# Patient Record
Sex: FEMALE | Race: WHITE | NOT HISPANIC OR LATINO | Employment: OTHER | URBAN - METROPOLITAN AREA
[De-identification: names, ages, dates, MRNs, and addresses within clinical notes are randomized per-mention and may not be internally consistent; named-entity substitution may affect disease eponyms.]

---

## 2022-09-09 ENCOUNTER — HOSPITAL ENCOUNTER (EMERGENCY)
Facility: CLINIC | Age: 72
Discharge: HOME | End: 2022-09-09
Attending: PHYSICIAN ASSISTANT | Admitting: PHYSICIAN ASSISTANT
Payer: MEDICARE

## 2022-09-09 VITALS
TEMPERATURE: 98 F | OXYGEN SATURATION: 96 % | DIASTOLIC BLOOD PRESSURE: 53 MMHG | WEIGHT: 154 LBS | SYSTOLIC BLOOD PRESSURE: 135 MMHG | HEART RATE: 70 BPM

## 2022-09-09 DIAGNOSIS — T63.481A ALLERGIC REACTION TO INSECT STING: ICD-10-CM

## 2022-09-09 PROCEDURE — G0463 HOSPITAL OUTPT CLINIC VISIT: HCPCS | Performed by: PHYSICIAN ASSISTANT

## 2022-09-09 PROCEDURE — 99203 OFFICE O/P NEW LOW 30 MIN: CPT | Performed by: PHYSICIAN ASSISTANT

## 2022-09-09 RX ORDER — LOSARTAN POTASSIUM AND HYDROCHLOROTHIAZIDE 12.5; 1 MG/1; MG/1
TABLET ORAL
COMMUNITY

## 2022-09-09 RX ORDER — PREDNISONE 20 MG/1
TABLET ORAL
Qty: 10 TABLET | Refills: 0 | Status: SHIPPED | OUTPATIENT
Start: 2022-09-09

## 2022-09-09 RX ORDER — ATORVASTATIN CALCIUM 20 MG/1
TABLET, FILM COATED ORAL
COMMUNITY
Start: 2022-06-08

## 2022-09-09 RX ORDER — CEPHALEXIN 500 MG/1
500 CAPSULE ORAL 4 TIMES DAILY
Qty: 20 CAPSULE | Refills: 0 | Status: SHIPPED | OUTPATIENT
Start: 2022-09-09 | End: 2022-09-14

## 2022-09-09 ASSESSMENT — ACTIVITIES OF DAILY LIVING (ADL): ADLS_ACUITY_SCORE: 35

## 2022-09-09 NOTE — DISCHARGE INSTRUCTIONS
Do not take ibuprofen while taking prednisone.  Advised taking prednisone with food to avoid excess GI irritation.    May continue applying cool packs to the affected area on occasion.  May take Zyrtec for symptomatic relief of itching.    Return to the emergency department if the pain/redness/swelling increases acutely over the next 24 to 48 hours.  Seek urgent medical evaluation if you develop concerns of breathing or swallowing, facial swelling, tongue swelling, feeling like the throat is swelling shut, chest pain or shortness of breath.    You should see improvement in symptoms in 24 to 48 hours after starting antibiotic. Return to clinic if symptoms worsen or persist for more than 48 hours.     Recommend taking a probiotic at the same time you are on antibiotic. Probiotic supports and maintains digestive health while taking antibiotic.

## 2022-09-09 NOTE — ED PROVIDER NOTES
History     Chief Complaint   Patient presents with     Insect Bite     Patient presents today with swollen eye . Symptoms started 4 days ago after being stung by a bee . Arrived to urgent care ambulatory.       JG Mcdonough is a 72 year old female who presents for evaluation of swelling around her left eye which began 4 days ago after being stung by an insect.  She is unsure whether she had been stung once or twice.  She has no acute vision changes, was not stung in the eye, no foreign body sensation.  Denies headaches or fevers at this time.  She is concerned because the swelling around the left eye is progressively worsened over the past 2 days.  She has not found any stingers or foreign bodies in the skin around the eye.  No concerns with breathing or swallowing.  No previous allergic reactions that have required emergency intervention, no EpiPen use in the past.  No tongue swelling or facial swelling, no neck pain or swelling.  No headaches.  No chest pain or shortness of breath currently.  Has mild swelling underneath her right eye as well.  She has been applying Benadryl cream, taking oral antihistamine and applying cold packs with limited relief.  She is concerned about an infection of the skin above the left eye.    Allergies:  No Known Allergies    Problem List:    There are no problems to display for this patient.       Past Medical History:    History reviewed. No pertinent past medical history.    Past Surgical History:    History reviewed. No pertinent surgical history.    Family History:    History reviewed. No pertinent family history.    Social History:  Marital Status:   [2]  Social History     Tobacco Use     Smoking status: Never Smoker     Smokeless tobacco: Never Used        Medications:    cephALEXin (KEFLEX) 500 MG capsule  predniSONE (DELTASONE) 20 MG tablet  atorvastatin (LIPITOR) 20 MG tablet  losartan-hydrochlorothiazide (HYZAAR) 100-12.5 MG tablet          Review of  Systems   Constitutional: Negative.    Eyes: Negative for photophobia, pain, discharge, redness, itching and visual disturbance.   Respiratory: Negative.    Skin: Positive for color change and rash. Negative for wound.       Physical Exam   BP: 135/53  Pulse: 70  Temp: 98  F (36.7  C)  Weight: 69.9 kg (154 lb)  SpO2: 96 %      Physical Exam  Constitutional:       General: She is not in acute distress.     Appearance: Normal appearance. She is not ill-appearing, toxic-appearing or diaphoretic.   HENT:      Head: Normocephalic and atraumatic.   Eyes:      General: Lids are everted, no foreign bodies appreciated. Vision grossly intact. No visual field deficit or scleral icterus.        Right eye: No foreign body, discharge or hordeolum.         Left eye: No foreign body, discharge or hordeolum.      Extraocular Movements: Extraocular movements intact.      Right eye: Normal extraocular motion and no nystagmus.      Left eye: Normal extraocular motion and no nystagmus.      Conjunctiva/sclera: Conjunctivae normal.      Right eye: Right conjunctiva is not injected. No chemosis.     Left eye: Left conjunctiva is not injected. No chemosis.     Pupils: Pupils are equal, round, and reactive to light. Pupils are equal.      Right eye: Pupil is round, reactive and not sluggish.      Left eye: Pupil is round, reactive and not sluggish.      Funduscopic exam:     Right eye: No hemorrhage. Red reflex present.         Left eye: No hemorrhage. Red reflex present.     Comments: Left upper eyelid is swollen and erythematous, nontender.  No open wounds, no purulent drainage.   Cardiovascular:      Rate and Rhythm: Normal rate and regular rhythm.      Pulses: Normal pulses.      Heart sounds: Normal heart sounds. No murmur heard.  Pulmonary:      Effort: Pulmonary effort is normal. No respiratory distress.      Breath sounds: Normal breath sounds. No stridor. No wheezing, rhonchi or rales.   Chest:      Chest wall: No tenderness.    Musculoskeletal:      Cervical back: Normal range of motion and neck supple. No rigidity.   Skin:            Comments: Erythema and induration with well demarcated borders above the left eye.  Appearance consistent with allergic shiners underneath both eyes.   Neurological:      Mental Status: She is alert.         ED Course                 Procedures                  No results found for this or any previous visit (from the past 24 hour(s)).    Medications - No data to display    Assessments & Plan (with Medical Decision Making)     The patient is a 72-year-old female presenting for evaluation of an allergic reaction following an insect sting 4 days ago.  She has had increased swelling above and below her left eye especially, also below the right eye.  Has some swelling of the left upper eyelid but no acute concerns with the left eye itself.  Has the appearance of allergic shiners beneath both eyes.  No headaches, no concerns with breathing or swallowing.  She is in no apparent distress.    Starting the patient on oral prednisone and oral Keflex today due to concern for localized infection and an inflammatory response.  I am more concerned about an inflammatory response at this point but I am concerned about the potential for a localized skin infection in the affected area.    Side effects discussed.  Do not take ibuprofen while taking prednisone.  Advised taking prednisone with food to avoid excess GI irritation.    May continue applying cool packs to the affected area on occasion.  May take Zyrtec for symptomatic relief of itching.    Discussed return precautions.  Advised her to return to the emergency department if the pain/redness/swelling increases acutely over the next 24 to 48 hours.  Seek urgent medical evaluation if you develop concerns of breathing or swallowing, facial swelling, tongue swelling, feeling like the throat is swelling shut, chest pain or shortness of breath.    You should see improvement  in symptoms in 24 to 48 hours after starting antibiotic. Return to clinic if symptoms worsen or persist for more than 48 hours.     Recommend taking a probiotic at the same time you are on antibiotic. Probiotic supports and maintains digestive health while taking antibiotic.      I have reviewed the nursing notes.    I have reviewed the findings, diagnosis, plan and need for follow up with the patient.      Discharge Medication List as of 9/9/2022  3:55 PM      START taking these medications    Details   cephALEXin (KEFLEX) 500 MG capsule Take 1 capsule (500 mg) by mouth 4 times daily for 5 days, Disp-20 capsule, R-0, E-Prescribe      predniSONE (DELTASONE) 20 MG tablet Take two tablets (= 40mg) each day for 5 (five) days, Disp-10 tablet, R-0, E-Prescribe             Final diagnoses:   Allergic reaction to insect sting       9/9/2022   [facility] EMERGENCY DEPT     Cruz Tan PA-C  09/11/22 [id number]

## 2022-09-11 ASSESSMENT — ENCOUNTER SYMPTOMS
COLOR CHANGE: 1
EYE ITCHING: 0
PHOTOPHOBIA: 0
WOUND: 0
CONSTITUTIONAL NEGATIVE: 1
EYE PAIN: 0
EYE REDNESS: 0
EYE DISCHARGE: 0
RESPIRATORY NEGATIVE: 1

## 2022-09-11 ASSESSMENT — VISUAL ACUITY: OU: 1
